# Patient Record
Sex: MALE | Race: BLACK OR AFRICAN AMERICAN | NOT HISPANIC OR LATINO | URBAN - METROPOLITAN AREA
[De-identification: names, ages, dates, MRNs, and addresses within clinical notes are randomized per-mention and may not be internally consistent; named-entity substitution may affect disease eponyms.]

---

## 2019-01-30 ENCOUNTER — INPATIENT (INPATIENT)
Facility: HOSPITAL | Age: 34
LOS: 6 days | Discharge: HOME | End: 2019-02-06
Attending: PSYCHIATRY & NEUROLOGY | Admitting: PSYCHIATRY & NEUROLOGY
Payer: MEDICAID

## 2019-01-30 VITALS
RESPIRATION RATE: 18 BRPM | HEART RATE: 82 BPM | TEMPERATURE: 98 F | SYSTOLIC BLOOD PRESSURE: 130 MMHG | DIASTOLIC BLOOD PRESSURE: 68 MMHG | OXYGEN SATURATION: 98 %

## 2019-01-30 DIAGNOSIS — F20.9 SCHIZOPHRENIA, UNSPECIFIED: ICD-10-CM

## 2019-01-30 DIAGNOSIS — F29 UNSPECIFIED PSYCHOSIS NOT DUE TO A SUBSTANCE OR KNOWN PHYSIOLOGICAL CONDITION: ICD-10-CM

## 2019-01-30 LAB
ALBUMIN SERPL ELPH-MCNC: 4.4 G/DL — SIGNIFICANT CHANGE UP (ref 3.3–5)
ALP SERPL-CCNC: 59 U/L — SIGNIFICANT CHANGE UP (ref 40–120)
ALT FLD-CCNC: 22 U/L — SIGNIFICANT CHANGE UP (ref 4–41)
AMPHET UR-MCNC: POSITIVE — SIGNIFICANT CHANGE UP
ANION GAP SERPL CALC-SCNC: 13 MMO/L — SIGNIFICANT CHANGE UP (ref 7–14)
APAP SERPL-MCNC: < 15 UG/ML — LOW (ref 15–25)
APPEARANCE UR: CLEAR — SIGNIFICANT CHANGE UP
AST SERPL-CCNC: 21 U/L — SIGNIFICANT CHANGE UP (ref 4–40)
BACTERIA # UR AUTO: NEGATIVE — SIGNIFICANT CHANGE UP
BARBITURATES UR SCN-MCNC: NEGATIVE — SIGNIFICANT CHANGE UP
BASOPHILS # BLD AUTO: 0.04 K/UL — SIGNIFICANT CHANGE UP (ref 0–0.2)
BASOPHILS NFR BLD AUTO: 0.6 % — SIGNIFICANT CHANGE UP (ref 0–2)
BENZODIAZ UR-MCNC: NEGATIVE — SIGNIFICANT CHANGE UP
BILIRUB SERPL-MCNC: 0.4 MG/DL — SIGNIFICANT CHANGE UP (ref 0.2–1.2)
BILIRUB UR-MCNC: NEGATIVE — SIGNIFICANT CHANGE UP
BLOOD UR QL VISUAL: NEGATIVE — SIGNIFICANT CHANGE UP
BUN SERPL-MCNC: 9 MG/DL — SIGNIFICANT CHANGE UP (ref 7–23)
CALCIUM SERPL-MCNC: 9.2 MG/DL — SIGNIFICANT CHANGE UP (ref 8.4–10.5)
CANNABINOIDS UR-MCNC: NEGATIVE — SIGNIFICANT CHANGE UP
CHLORIDE SERPL-SCNC: 104 MMOL/L — SIGNIFICANT CHANGE UP (ref 98–107)
CO2 SERPL-SCNC: 26 MMOL/L — SIGNIFICANT CHANGE UP (ref 22–31)
COCAINE METAB.OTHER UR-MCNC: NEGATIVE — SIGNIFICANT CHANGE UP
COLOR SPEC: YELLOW — SIGNIFICANT CHANGE UP
CREAT SERPL-MCNC: 0.81 MG/DL — SIGNIFICANT CHANGE UP (ref 0.5–1.3)
EOSINOPHIL # BLD AUTO: 0.08 K/UL — SIGNIFICANT CHANGE UP (ref 0–0.5)
EOSINOPHIL NFR BLD AUTO: 1.1 % — SIGNIFICANT CHANGE UP (ref 0–6)
ETHANOL BLD-MCNC: < 10 MG/DL — SIGNIFICANT CHANGE UP
GLUCOSE SERPL-MCNC: 90 MG/DL — SIGNIFICANT CHANGE UP (ref 70–99)
GLUCOSE UR-MCNC: NEGATIVE — SIGNIFICANT CHANGE UP
HCT VFR BLD CALC: 43.3 % — SIGNIFICANT CHANGE UP (ref 39–50)
HGB BLD-MCNC: 14 G/DL — SIGNIFICANT CHANGE UP (ref 13–17)
HYALINE CASTS # UR AUTO: NEGATIVE — SIGNIFICANT CHANGE UP
IMM GRANULOCYTES NFR BLD AUTO: 0.3 % — SIGNIFICANT CHANGE UP (ref 0–1.5)
KETONES UR-MCNC: NEGATIVE — SIGNIFICANT CHANGE UP
LEUKOCYTE ESTERASE UR-ACNC: NEGATIVE — SIGNIFICANT CHANGE UP
LYMPHOCYTES # BLD AUTO: 1.78 K/UL — SIGNIFICANT CHANGE UP (ref 1–3.3)
LYMPHOCYTES # BLD AUTO: 25.5 % — SIGNIFICANT CHANGE UP (ref 13–44)
MCHC RBC-ENTMCNC: 29.7 PG — SIGNIFICANT CHANGE UP (ref 27–34)
MCHC RBC-ENTMCNC: 32.3 % — SIGNIFICANT CHANGE UP (ref 32–36)
MCV RBC AUTO: 91.9 FL — SIGNIFICANT CHANGE UP (ref 80–100)
METHADONE UR-MCNC: NEGATIVE — SIGNIFICANT CHANGE UP
MONOCYTES # BLD AUTO: 0.64 K/UL — SIGNIFICANT CHANGE UP (ref 0–0.9)
MONOCYTES NFR BLD AUTO: 9.2 % — SIGNIFICANT CHANGE UP (ref 2–14)
NEUTROPHILS # BLD AUTO: 4.41 K/UL — SIGNIFICANT CHANGE UP (ref 1.8–7.4)
NEUTROPHILS NFR BLD AUTO: 63.3 % — SIGNIFICANT CHANGE UP (ref 43–77)
NITRITE UR-MCNC: NEGATIVE — SIGNIFICANT CHANGE UP
NRBC # FLD: 0 K/UL — LOW (ref 25–125)
OPIATES UR-MCNC: NEGATIVE — SIGNIFICANT CHANGE UP
OXYCODONE UR-MCNC: NEGATIVE — SIGNIFICANT CHANGE UP
PCP UR-MCNC: NEGATIVE — SIGNIFICANT CHANGE UP
PH UR: 8.5 — HIGH (ref 5–8)
PLATELET # BLD AUTO: 297 K/UL — SIGNIFICANT CHANGE UP (ref 150–400)
PMV BLD: 8.9 FL — SIGNIFICANT CHANGE UP (ref 7–13)
POTASSIUM SERPL-MCNC: 4.1 MMOL/L — SIGNIFICANT CHANGE UP (ref 3.5–5.3)
POTASSIUM SERPL-SCNC: 4.1 MMOL/L — SIGNIFICANT CHANGE UP (ref 3.5–5.3)
PROT SERPL-MCNC: 6.9 G/DL — SIGNIFICANT CHANGE UP (ref 6–8.3)
PROT UR-MCNC: 50 — SIGNIFICANT CHANGE UP
RBC # BLD: 4.71 M/UL — SIGNIFICANT CHANGE UP (ref 4.2–5.8)
RBC # FLD: 12.3 % — SIGNIFICANT CHANGE UP (ref 10.3–14.5)
RBC CASTS # UR COMP ASSIST: SIGNIFICANT CHANGE UP (ref 0–?)
SALICYLATES SERPL-MCNC: < 5 MG/DL — LOW (ref 15–30)
SODIUM SERPL-SCNC: 143 MMOL/L — SIGNIFICANT CHANGE UP (ref 135–145)
SP GR SPEC: 1.03 — SIGNIFICANT CHANGE UP (ref 1–1.04)
SQUAMOUS # UR AUTO: SIGNIFICANT CHANGE UP
TSH SERPL-MCNC: 1.18 UIU/ML — SIGNIFICANT CHANGE UP (ref 0.27–4.2)
UROBILINOGEN FLD QL: SIGNIFICANT CHANGE UP
WBC # BLD: 6.97 K/UL — SIGNIFICANT CHANGE UP (ref 3.8–10.5)
WBC # FLD AUTO: 6.97 K/UL — SIGNIFICANT CHANGE UP (ref 3.8–10.5)
WBC UR QL: SIGNIFICANT CHANGE UP (ref 0–?)

## 2019-01-30 PROCEDURE — 99285 EMERGENCY DEPT VISIT HI MDM: CPT | Mod: GC

## 2019-01-30 RX ORDER — HALOPERIDOL DECANOATE 100 MG/ML
5 INJECTION INTRAMUSCULAR EVERY 6 HOURS
Qty: 0 | Refills: 0 | Status: DISCONTINUED | OUTPATIENT
Start: 2019-01-31 | End: 2019-02-06

## 2019-01-30 RX ORDER — DIPHENHYDRAMINE HCL 50 MG
50 CAPSULE ORAL EVERY 6 HOURS
Qty: 0 | Refills: 0 | Status: DISCONTINUED | OUTPATIENT
Start: 2019-01-31 | End: 2019-02-06

## 2019-01-30 RX ORDER — RISPERIDONE 4 MG/1
1 TABLET ORAL ONCE
Qty: 0 | Refills: 0 | Status: COMPLETED | OUTPATIENT
Start: 2019-01-30 | End: 2019-01-30

## 2019-01-30 RX ORDER — DIPHENHYDRAMINE HCL 50 MG
50 CAPSULE ORAL ONCE
Qty: 0 | Refills: 0 | Status: DISCONTINUED | OUTPATIENT
Start: 2019-01-31 | End: 2019-02-06

## 2019-01-30 RX ORDER — HALOPERIDOL DECANOATE 100 MG/ML
5 INJECTION INTRAMUSCULAR ONCE
Qty: 0 | Refills: 0 | Status: DISCONTINUED | OUTPATIENT
Start: 2019-01-31 | End: 2019-02-06

## 2019-01-30 RX ADMIN — RISPERIDONE 1 MILLIGRAM(S): 4 TABLET ORAL at 21:57

## 2019-01-30 NOTE — ED BEHAVIORAL HEALTH ASSESSMENT NOTE - HIGH RISK FOR ASSAULT DETAILS
Patient paranoid, feels creatures are after him, bought a baseball bat and asking for matches to "lundberg off ghosts"

## 2019-01-30 NOTE — ED BEHAVIORAL HEALTH ASSESSMENT NOTE - CASE SUMMARY
IN BRIEF, this is a 34 yo M with schizophrenia, living in a hotel in [facility], from , sent in by hotel staff with paranoia and hallucinations in the setting of medication non-compliance.  Will re-start meds for patient and stabilize him prior to discharge.

## 2019-01-30 NOTE — ED BEHAVIORAL HEALTH ASSESSMENT NOTE - HPI (INCLUDE ILLNESS QUALITY, SEVERITY, DURATION, TIMING, CONTEXT, MODIFYING FACTORS, ASSOCIATED SIGNS AND SYMPTOMS)
Patient is a 33 year old M, undomiciled, currently in [place] staying in a hotel over past 2 months, on disability, with a previous diagnosis of ___ , ___ prior hospitalizations, most recent ___, not in outpatient treatment, no known hx of self harm behaviors or SA, daily crystal meth use, no known PMHx, who was BIB EMS activated by hotel after patient requested matches to lundberg off ghosts.     On evaluation, patient reports that there are "little winged creatures" who followed him to [facility] from [place]. Reports that he has been hearing them say things like "I'm gonna get you" but denies that he has seen them. States he bought a baseball bat to defend himself. When he continued to hear them taunt him, and began smelling vomit and feces, he called the hotel's  to request matches so that he could warn them off. At that point, hotel staff activated 911 and he was brought to the ED.    Patient reports that he was in a hospital in [place] for over 6 months, when is     Collateral obtained from Dr. Gage [facility]. Patient seen there once last spring, addmitted to long term psych care facility for exposing genitals to staff. He was a lundberg of the count from jan to dec 2018, until he applied and had that removed. [place] and [place] to [place] and wad discharged from the program. meds Invega 156mg. due december 6th. Patient is a 33 year old M, undomiciled, currently in [place] staying in a hotel over past 2 months, on disability, with a diagnosis of Schizophrenia, unknown number of prior psychiatric hospitalizations, not in outpatient treatment, no known hx of self harm behaviors or SA, daily crystal meth use, no known PMHx, who was BIB EMS activated by [facility] after patient requested matches to lundberg off ghosts.     On evaluation, patient reports that there are "little winged creatures" who followed him to [facility] from [place]. Reports that he has been hearing them say things like "I'm gonna get you" but denies that he has seen them. States he bought a baseball bat to defend himself. When he continued to hear them taunt him, and began smelling vomit and feces, [facility]  [facility] matches so that he could warn them off. At that point, hotel staff activated 911 and he was brought to the ED.    Patient reports that he was in a hospital in [place] for over 6 months. States his grandmother  and he inherited $150,000, prompting him to buy a flight to [place]. He's been living in hotels in [place] over the past 2 months. Reports that he has been spending his time with a friend from his hospitalization in [place], going shopping, and going to [employer]. He has been smoking crystal meth daily.     Denies persistently depressed mood / anhedonia. Sleeping well. No changes in appetite. Denies SIIP or urges to self harm. Denies aggressive urges. Denies HIIP.     Collateral obtained from Dr. Gage at  [facility]. Reports that patient was most recently seen in the ED in Spring 2018 after he exposed his genital to staff at the long-term psychiatric care facility he was living in.  Reports that he was a lundberg of the court from 2018 to 2018, until he applied for this to be removed, which it was. Afterwards, he eloped from the facility and was discharged from the program. Reports he was on Invega Sustenna 156mg IM, and was due for next injection on Due .

## 2019-01-30 NOTE — ED PROVIDER NOTE - OBJECTIVE STATEMENT
33 yr old male with underlying psych disorder (states was on mood stabilizers in past including lithium) from [place]  has been staying in [place] hotels for past month.  Recently lighting candles "to keep away ghosts"  When he told hotel employees this they called EMS.  Also  has a bat to prevent himself from any danger.

## 2019-01-30 NOTE — ED BEHAVIORAL HEALTH ASSESSMENT NOTE - PSYCHIATRIC ISSUES AND PLAN (INCLUDE STANDING AND PRN MEDICATION)
Risperdal 2mg qhs, Haldol 5mg PO/IM PRN, Ativan 2mg PO/IM PRN, Benadryl 50mg PO/IM PRN Patient started on Risperdal in the ED (given 1mg), Haldol 5mg PO/IM PRN, Ativan 2mg PO/IM PRN, Benadryl 50mg PO/IM PRN

## 2019-01-30 NOTE — ED BEHAVIORAL HEALTH ASSESSMENT NOTE - SUMMARY
Patient is a 33 year old M, undomiciled in [place], currently in [place] staying in a hotel over past 2 months, on disability, with a diagnosis of Schizophrenia, unknown number of prior psychiatric hospitalizations, but most recently in long-term psychiatric care facility, not in outpatient treatment, no known hx of self harm behaviors or SA, daily crystal meth use, no known PMHx, who [facility] after patient requested matches to lundberg off ghosts.    On evaluation, patient acutely psychotic with auditory/olfactory hallucinations and delusions, as well as prominent thought disorganization. Patent has been asking hotel staff for matches to lundberg off ghosts, and bought a baseball bat to protect himself. He is currently considered to be a risk to self and others at this time and requires inpatient hospitalization for safety and stabilization.

## 2019-01-30 NOTE — ED ADULT NURSE NOTE - OBJECTIVE STATEMENT
Pt rcvd to  c/o hearing voices. Pt believes to be hearing ghosts and states the voices are trying to get him. Denies any type of commands from voices. Pt states that he lives in [place] and has been visiting [place] x 1 month, staying in hotels. States he would like to protect himself from the voices with a bat. Pt is not sure his mental health hx but states he was placed on mood stabilizers in the past. Pt denies HI/SI/Substance abuse/ETOH at this time. In NAD. Appears calm/cooperative. Respirations even/unlabored. VSS on arrival.

## 2019-01-30 NOTE — ED ADULT NURSE NOTE - NSIMPLEMENTINTERV_GEN_ALL_ED
Implemented All Universal Safety Interventions:  [place] to call system. Call bell, personal items and telephone within reach. Instruct patient to call for assistance. Room bathroom lighting operational. Non-slip footwear when patient is off stretcher. Physically safe environment: no spills, clutter or unnecessary equipment. Stretcher in lowest position, wheels locked, appropriate side rails in place.

## 2019-01-30 NOTE — ED BEHAVIORAL HEALTH ASSESSMENT NOTE - RISK ASSESSMENT
Chronic risk factors include underlying diagnosis of schizophrenia, hx of psychiatric hospitalizations, hx of treatment non compliance, limited social supports, and hx of substance misuse. Other risk factors include acute psychosis and current substance misuse. Patient has few protective factors, though he is not suicidal and has no hx of SA. Patient is currently considered to be an acute risk to self and others at this time and requires psychiatric hospitalization for safety and stabilization.

## 2019-01-30 NOTE — ED ADULT TRIAGE NOTE - CHIEF COMPLAINT QUOTE
Patient brought to ER by EMS from [facility] (pt lives in [place]) that he has been staying in for 2 months. Pt was at [facility] and asked for matches so that he could lundberg off ghosts. He called 911 because of that and wants to speak to someone. he is having hallucinations, he feels threatened and has a baseball bat for safety. Pt states he has been taking meds to stabilize his mood for the last 6-7 months.

## 2019-01-30 NOTE — ED BEHAVIORAL HEALTH ASSESSMENT NOTE - DESCRIPTION
Patient was calm and cooperative in the ED and did not exhibit any aggression. Pt did not require any prn medications or any physical restraints. None known Undomiciled in [facility]. Was at a psychiatric long term care facility until relatively recently. Inherited some money and decided to take a trip to [place]. Has been living in hotels in [place] over past 2 months.

## 2019-01-30 NOTE — ED ADULT NURSE NOTE - CHIEF COMPLAINT QUOTE
Patient brought to ER by EMS from [facility] (pt lives in [place]) that he has been staying in for 2 months. Pt was at [facility] and asked for matches so that he could lundberg off ghosts. He called 911 because of that and wants to speak to someone. he is having hallucinations, he feels threatened and has a baseball bat for safety. Pt states he has been taking meds to stabilize his mood for the last 6-7 months.

## 2019-01-30 NOTE — ED BEHAVIORAL HEALTH ASSESSMENT NOTE - OTHER PAST PSYCHIATRIC HISTORY (INCLUDE DETAILS REGARDING ONSET, COURSE OF ILLNESS, INPATIENT/OUTPATIENT TREATMENT)
Dx of Schizophrenia, unknown number of prior psychiatric hospitalizations, was most recently in a psychiatric long term care facility in [place]. Not in outpatient treatment. Not taking medications.    Was due for Invega Sustenna 156mg IM on 12/6/19

## 2019-01-31 PROCEDURE — 99222 1ST HOSP IP/OBS MODERATE 55: CPT | Mod: GC

## 2019-01-31 RX ORDER — PALIPERIDONE 1.5 MG/1
234 TABLET, EXTENDED RELEASE ORAL ONCE
Qty: 0 | Refills: 0 | Status: COMPLETED | OUTPATIENT
Start: 2019-01-31 | End: 2019-01-31

## 2019-01-31 RX ORDER — RISPERIDONE 4 MG/1
2 TABLET ORAL AT BEDTIME
Qty: 0 | Refills: 0 | Status: DISCONTINUED | OUTPATIENT
Start: 2019-01-31 | End: 2019-02-05

## 2019-01-31 RX ADMIN — RISPERIDONE 2 MILLIGRAM(S): 4 TABLET ORAL at 22:17

## 2019-01-31 RX ADMIN — PALIPERIDONE 234 MILLIGRAM(S): 1.5 TABLET, EXTENDED RELEASE ORAL at 18:25

## 2019-02-01 PROCEDURE — 99232 SBSQ HOSP IP/OBS MODERATE 35: CPT | Mod: GC

## 2019-02-01 RX ORDER — ONDANSETRON 8 MG/1
4 TABLET, FILM COATED ORAL EVERY 8 HOURS
Qty: 0 | Refills: 0 | Status: DISCONTINUED | OUTPATIENT
Start: 2019-02-01 | End: 2019-02-06

## 2019-02-01 RX ADMIN — RISPERIDONE 2 MILLIGRAM(S): 4 TABLET ORAL at 21:01

## 2019-02-02 RX ADMIN — RISPERIDONE 2 MILLIGRAM(S): 4 TABLET ORAL at 20:49

## 2019-02-03 PROCEDURE — 99231 SBSQ HOSP IP/OBS SF/LOW 25: CPT

## 2019-02-03 RX ADMIN — RISPERIDONE 2 MILLIGRAM(S): 4 TABLET ORAL at 21:03

## 2019-02-04 PROCEDURE — 99232 SBSQ HOSP IP/OBS MODERATE 35: CPT | Mod: GC

## 2019-02-04 RX ADMIN — RISPERIDONE 2 MILLIGRAM(S): 4 TABLET ORAL at 21:53

## 2019-02-05 PROCEDURE — 99232 SBSQ HOSP IP/OBS MODERATE 35: CPT | Mod: GC

## 2019-02-05 RX ORDER — PALIPERIDONE 1.5 MG/1
156 TABLET, EXTENDED RELEASE ORAL ONCE
Qty: 0 | Refills: 0 | Status: COMPLETED | OUTPATIENT
Start: 2019-02-05 | End: 2019-02-05

## 2019-02-05 RX ADMIN — PALIPERIDONE 156 MILLIGRAM(S): 1.5 TABLET, EXTENDED RELEASE ORAL at 18:50

## 2019-02-06 VITALS — TEMPERATURE: 98 F

## 2019-02-06 LAB
CHOLEST SERPL-MCNC: 160 MG/DL — SIGNIFICANT CHANGE UP (ref 120–199)
HDLC SERPL-MCNC: 39 MG/DL — SIGNIFICANT CHANGE UP (ref 35–55)
LIPID PNL WITH DIRECT LDL SERPL: 113 MG/DL — SIGNIFICANT CHANGE UP
TRIGL SERPL-MCNC: 92 MG/DL — SIGNIFICANT CHANGE UP (ref 10–149)

## 2019-02-06 PROCEDURE — 99238 HOSP IP/OBS DSCHRG MGMT 30/<: CPT | Mod: GC
